# Patient Record
Sex: FEMALE | Race: WHITE | Employment: FULL TIME | URBAN - METROPOLITAN AREA
[De-identification: names, ages, dates, MRNs, and addresses within clinical notes are randomized per-mention and may not be internally consistent; named-entity substitution may affect disease eponyms.]

---

## 2018-02-27 ENCOUNTER — HOSPITAL ENCOUNTER (OUTPATIENT)
Dept: MAMMOGRAPHY | Age: 43
Discharge: HOME | End: 2018-02-27
Attending: NURSE PRACTITIONER
Payer: SELF-PAY

## 2018-02-27 DIAGNOSIS — N63.0 UNSPECIFIED LUMP IN UNSPECIFIED BREAST: ICD-10-CM

## 2018-02-27 PROCEDURE — 76642 ULTRASOUND BREAST LIMITED: CPT

## 2018-02-27 PROCEDURE — 77066 DX MAMMO INCL CAD BI: CPT

## 2019-06-11 ENCOUNTER — APPOINTMENT (RX ONLY)
Dept: URBAN - METROPOLITAN AREA CLINIC 349 | Facility: CLINIC | Age: 44
Setting detail: DERMATOLOGY
End: 2019-06-11

## 2019-06-11 DIAGNOSIS — Z71.89 OTHER SPECIFIED COUNSELING: ICD-10-CM

## 2019-06-11 DIAGNOSIS — D18.0 HEMANGIOMA: ICD-10-CM

## 2019-06-11 DIAGNOSIS — D22 MELANOCYTIC NEVI: ICD-10-CM

## 2019-06-11 PROBLEM — K21.9 GASTRO-ESOPHAGEAL REFLUX DISEASE WITHOUT ESOPHAGITIS: Status: ACTIVE | Noted: 2019-06-11

## 2019-06-11 PROBLEM — J45.909 UNSPECIFIED ASTHMA, UNCOMPLICATED: Status: ACTIVE | Noted: 2019-06-11

## 2019-06-11 PROBLEM — D22.5 MELANOCYTIC NEVI OF TRUNK: Status: ACTIVE | Noted: 2019-06-11

## 2019-06-11 PROBLEM — D22.71 MELANOCYTIC NEVI OF RIGHT LOWER LIMB, INCLUDING HIP: Status: ACTIVE | Noted: 2019-06-11

## 2019-06-11 PROBLEM — M12.9 ARTHROPATHY, UNSPECIFIED: Status: ACTIVE | Noted: 2019-06-11

## 2019-06-11 PROBLEM — D18.01 HEMANGIOMA OF SKIN AND SUBCUTANEOUS TISSUE: Status: ACTIVE | Noted: 2019-06-11

## 2019-06-11 PROCEDURE — A4550 SURGICAL TRAYS: HCPCS

## 2019-06-11 PROCEDURE — 99243 OFF/OP CNSLTJ NEW/EST LOW 30: CPT | Mod: 25

## 2019-06-11 PROCEDURE — 11306 SHAVE SKIN LESION 0.6-1.0 CM: CPT

## 2019-06-11 PROCEDURE — ? COUNSELING

## 2019-06-11 PROCEDURE — ? SHAVE REMOVAL

## 2019-06-11 PROCEDURE — ? EDUCATIONAL RESOURCES PROVIDED

## 2019-06-11 ASSESSMENT — LOCATION SIMPLE DESCRIPTION DERM
LOCATION SIMPLE: RIGHT 3RD TOE
LOCATION SIMPLE: ABDOMEN
LOCATION SIMPLE: RIGHT UPPER BACK

## 2019-06-11 ASSESSMENT — LOCATION ZONE DERM
LOCATION ZONE: TRUNK
LOCATION ZONE: TOE

## 2019-06-11 ASSESSMENT — LOCATION DETAILED DESCRIPTION DERM
LOCATION DETAILED: RIGHT LATERAL 3RD TOE
LOCATION DETAILED: SUBXIPHOID
LOCATION DETAILED: RIGHT MID-UPPER BACK

## 2019-06-11 NOTE — PROCEDURE: SHAVE REMOVAL
Detail Level: Detailed
Anesthesia Volume In Cc: 1.5
Medical Necessity Clause: This procedure was medically necessary because the lesion has a history of change
Size Of Lesion In Cm (Required): 0.6
Accession #: [facility]
Render Post-Care Instructions In Note?: yes
Consent was obtained from the patient. The risks and benefits to therapy were discussed in detail. Specifically, the risks of infection, scarring, bleeding, prolonged wound healing, incomplete removal, allergy to anesthesia, nerve injury and recurrence were addressed. Prior to the procedure, the treatment site was clearly identified and confirmed by the patient. All components of Universal Protocol/PAUSE Rule completed.
Wound Care: Vaseline
Billing Type: Third-Party Bill
Add Variable For Additional Medical Justification: No
Notification Instructions: Patient will be notified of biopsy results. However, patient instructed to call the office if not contacted within 2 weeks.
Medical Necessity Information: It is in your best interest to select a reason for this procedure from the list below. All of these items fulfill various CMS LCD requirements except the new and changing color options.
Biopsy Method: Personna blade
Hemostasis: Aluminum Chloride
X Size Of Lesion In Cm (Optional): 0
Post-Care Instructions: I reviewed with the patient in detail post-care instructions. Patient is to keep the biopsy site dry overnight, and then apply Vaseline daily until healed. Patient may apply hydrogen peroxide soaks to remove any crusting. After the procedure, the patient was oriented to person, place, and time. Patient denied feeling dizzy, queasy, and and declined further observation after initial 5 minute observation time.

## 2023-02-18 ENCOUNTER — APPOINTMENT (OUTPATIENT)
Dept: GENERAL RADIOLOGY | Age: 48
End: 2023-02-18
Payer: COMMERCIAL

## 2023-02-18 ENCOUNTER — HOSPITAL ENCOUNTER (EMERGENCY)
Age: 48
Discharge: HOME | End: 2023-02-18
Attending: EMERGENCY MEDICINE
Payer: COMMERCIAL

## 2023-02-18 VITALS
TEMPERATURE: 98.1 F | BODY MASS INDEX: 49.13 KG/M2 | RESPIRATION RATE: 17 BRPM | OXYGEN SATURATION: 100 % | HEIGHT: 62 IN | HEART RATE: 89 BPM | DIASTOLIC BLOOD PRESSURE: 93 MMHG | SYSTOLIC BLOOD PRESSURE: 140 MMHG | WEIGHT: 267 LBS

## 2023-02-18 DIAGNOSIS — R68.89 FLU-LIKE SYMPTOMS: Primary | ICD-10-CM

## 2023-02-18 LAB
ALBUMIN SERPL-MCNC: 3.1 G/DL (ref 3.5–5)
ALBUMIN/GLOB SERPL: 0.6 (ref 0.4–1.6)
ALP SERPL-CCNC: 79 U/L (ref 50–136)
ALT SERPL-CCNC: 54 U/L (ref 12–65)
ANION GAP SERPL CALC-SCNC: 6 MMOL/L (ref 2–11)
AST SERPL-CCNC: 48 U/L (ref 15–37)
BASOPHILS # BLD: 0.1 K/UL (ref 0–0.2)
BASOPHILS NFR BLD: 1 % (ref 0–2)
BILIRUB SERPL-MCNC: 0.2 MG/DL (ref 0.2–1.1)
BUN SERPL-MCNC: 13 MG/DL (ref 6–23)
CALCIUM SERPL-MCNC: 9.5 MG/DL (ref 8.3–10.4)
CHLORIDE SERPL-SCNC: 106 MMOL/L (ref 101–110)
CO2 SERPL-SCNC: 25 MMOL/L (ref 21–32)
CREAT SERPL-MCNC: 0.8 MG/DL (ref 0.6–1)
DIFFERENTIAL METHOD BLD: ABNORMAL
EKG ATRIAL RATE: 93 BPM
EKG DIAGNOSIS: NORMAL
EKG P AXIS: 6 DEGREES
EKG P-R INTERVAL: 141 MS
EKG Q-T INTERVAL: 368 MS
EKG QRS DURATION: 82 MS
EKG QTC CALCULATION (BAZETT): 456 MS
EKG R AXIS: 20 DEGREES
EKG T AXIS: 51 DEGREES
EKG VENTRICULAR RATE: 92 BPM
EOSINOPHIL # BLD: 0 K/UL (ref 0–0.8)
EOSINOPHIL NFR BLD: 0 % (ref 0.5–7.8)
ERYTHROCYTE [DISTWIDTH] IN BLOOD BY AUTOMATED COUNT: 14.7 % (ref 11.9–14.6)
FLUAV RNA SPEC QL NAA+PROBE: NOT DETECTED
FLUBV RNA SPEC QL NAA+PROBE: NOT DETECTED
GLOBULIN SER CALC-MCNC: 5.1 G/DL (ref 2.8–4.5)
GLUCOSE SERPL-MCNC: 147 MG/DL (ref 65–100)
HCT VFR BLD AUTO: 45.6 % (ref 35.8–46.3)
HGB BLD-MCNC: 14.8 G/DL (ref 11.7–15.4)
IMM GRANULOCYTES # BLD AUTO: 0.1 K/UL (ref 0–0.5)
IMM GRANULOCYTES NFR BLD AUTO: 1 % (ref 0–5)
LACTATE SERPL-SCNC: 1.8 MMOL/L (ref 0.4–2)
LACTATE SERPL-SCNC: 2.1 MMOL/L (ref 0.4–2)
LYMPHOCYTES # BLD: 1 K/UL (ref 0.5–4.6)
LYMPHOCYTES NFR BLD: 11 % (ref 13–44)
MAGNESIUM SERPL-MCNC: 2 MG/DL (ref 1.8–2.4)
MCH RBC QN AUTO: 28.2 PG (ref 26.1–32.9)
MCHC RBC AUTO-ENTMCNC: 32.5 G/DL (ref 31.4–35)
MCV RBC AUTO: 86.9 FL (ref 82–102)
MONOCYTES # BLD: 0.3 K/UL (ref 0.1–1.3)
MONOCYTES NFR BLD: 3 % (ref 4–12)
NEUTS SEG # BLD: 7.8 K/UL (ref 1.7–8.2)
NEUTS SEG NFR BLD: 84 % (ref 43–78)
NRBC # BLD: 0 K/UL (ref 0–0.2)
PLATELET # BLD AUTO: 476 K/UL (ref 150–450)
PMV BLD AUTO: 9 FL (ref 9.4–12.3)
POTASSIUM SERPL-SCNC: 3.9 MMOL/L (ref 3.5–5.1)
PROT SERPL-MCNC: 8.2 G/DL (ref 6.3–8.2)
RBC # BLD AUTO: 5.25 M/UL (ref 4.05–5.2)
SARS-COV-2 RDRP RESP QL NAA+PROBE: NOT DETECTED
SODIUM SERPL-SCNC: 137 MMOL/L (ref 133–143)
SOURCE: NORMAL
WBC # BLD AUTO: 9.3 K/UL (ref 4.3–11.1)

## 2023-02-18 PROCEDURE — 71046 X-RAY EXAM CHEST 2 VIEWS: CPT

## 2023-02-18 PROCEDURE — 83605 ASSAY OF LACTIC ACID: CPT

## 2023-02-18 PROCEDURE — 80053 COMPREHEN METABOLIC PANEL: CPT

## 2023-02-18 PROCEDURE — 96361 HYDRATE IV INFUSION ADD-ON: CPT

## 2023-02-18 PROCEDURE — 99285 EMERGENCY DEPT VISIT HI MDM: CPT

## 2023-02-18 PROCEDURE — 85025 COMPLETE CBC W/AUTO DIFF WBC: CPT

## 2023-02-18 PROCEDURE — 93005 ELECTROCARDIOGRAM TRACING: CPT | Performed by: EMERGENCY MEDICINE

## 2023-02-18 PROCEDURE — 87502 INFLUENZA DNA AMP PROBE: CPT

## 2023-02-18 PROCEDURE — 6360000002 HC RX W HCPCS: Performed by: EMERGENCY MEDICINE

## 2023-02-18 PROCEDURE — 96375 TX/PRO/DX INJ NEW DRUG ADDON: CPT

## 2023-02-18 PROCEDURE — 83735 ASSAY OF MAGNESIUM: CPT

## 2023-02-18 PROCEDURE — 96374 THER/PROPH/DIAG INJ IV PUSH: CPT

## 2023-02-18 PROCEDURE — 87635 SARS-COV-2 COVID-19 AMP PRB: CPT

## 2023-02-18 PROCEDURE — 2580000003 HC RX 258: Performed by: EMERGENCY MEDICINE

## 2023-02-18 RX ORDER — HYDROMORPHONE HYDROCHLORIDE 1 MG/ML
1 INJECTION, SOLUTION INTRAMUSCULAR; INTRAVENOUS; SUBCUTANEOUS
Status: COMPLETED | OUTPATIENT
Start: 2023-02-18 | End: 2023-02-18

## 2023-02-18 RX ORDER — 0.9 % SODIUM CHLORIDE 0.9 %
500 INTRAVENOUS SOLUTION INTRAVENOUS ONCE
Status: COMPLETED | OUTPATIENT
Start: 2023-02-18 | End: 2023-02-18

## 2023-02-18 RX ORDER — KETOROLAC TROMETHAMINE 30 MG/ML
30 INJECTION, SOLUTION INTRAMUSCULAR; INTRAVENOUS
Status: COMPLETED | OUTPATIENT
Start: 2023-02-18 | End: 2023-02-18

## 2023-02-18 RX ORDER — 0.9 % SODIUM CHLORIDE 0.9 %
1000 INTRAVENOUS SOLUTION INTRAVENOUS ONCE
Status: COMPLETED | OUTPATIENT
Start: 2023-02-18 | End: 2023-02-18

## 2023-02-18 RX ORDER — ONDANSETRON 2 MG/ML
4 INJECTION INTRAMUSCULAR; INTRAVENOUS
Status: COMPLETED | OUTPATIENT
Start: 2023-02-18 | End: 2023-02-18

## 2023-02-18 RX ORDER — ONDANSETRON 8 MG/1
8 TABLET, ORALLY DISINTEGRATING ORAL EVERY 8 HOURS PRN
Qty: 12 TABLET | Refills: 1 | Status: SHIPPED | OUTPATIENT
Start: 2023-02-18

## 2023-02-18 RX ADMIN — ONDANSETRON 4 MG: 2 INJECTION INTRAMUSCULAR; INTRAVENOUS at 19:06

## 2023-02-18 RX ADMIN — KETOROLAC TROMETHAMINE 30 MG: 30 INJECTION, SOLUTION INTRAMUSCULAR at 19:07

## 2023-02-18 RX ADMIN — SODIUM CHLORIDE 1000 ML: 9 INJECTION, SOLUTION INTRAVENOUS at 19:03

## 2023-02-18 RX ADMIN — HYDROMORPHONE HYDROCHLORIDE 1 MG: 1 INJECTION, SOLUTION INTRAMUSCULAR; INTRAVENOUS; SUBCUTANEOUS at 19:08

## 2023-02-18 RX ADMIN — SODIUM CHLORIDE 500 ML: 9 INJECTION, SOLUTION INTRAVENOUS at 20:41

## 2023-02-18 ASSESSMENT — ENCOUNTER SYMPTOMS
VOMITING: 0
COUGH: 1
EYE DISCHARGE: 0
BACK PAIN: 1
NAUSEA: 1
RHINORRHEA: 0
EYE REDNESS: 0
COLOR CHANGE: 0
FACIAL SWELLING: 0
ABDOMINAL PAIN: 0
DIARRHEA: 0

## 2023-02-18 ASSESSMENT — PAIN SCALES - GENERAL
PAINLEVEL_OUTOF10: 0
PAINLEVEL_OUTOF10: 3
PAINLEVEL_OUTOF10: 3

## 2023-02-18 ASSESSMENT — LIFESTYLE VARIABLES
HOW MANY STANDARD DRINKS CONTAINING ALCOHOL DO YOU HAVE ON A TYPICAL DAY: PATIENT DOES NOT DRINK
HOW OFTEN DO YOU HAVE A DRINK CONTAINING ALCOHOL: NEVER

## 2023-02-18 NOTE — ED TRIAGE NOTES
Patient arrives to ED pov from home. Patient reports flu like symptoms since Monday. Patient reports she has been tested for flu and covid and they have been negative. Patient reports nausea. Denies vomiting. Patient reports history of asthma.

## 2023-02-19 NOTE — DISCHARGE INSTRUCTIONS
Alternate 4 ibuprofen every 8 hours with 2 extra-strength tylenol every 6 hours  Zofran as needed for nausea  Drink plenty of fluids

## 2023-02-19 NOTE — ED NOTES
I have reviewed discharge instructions with the patient. The patient verbalized understanding. Patient left ED via Discharge Method: ambulatory to Home with (insert name of family/friend, self, transport self). Opportunity for questions and clarification provided. Patient given 1 scripts. To continue your aftercare when you leave the hospital, you may receive an automated call from our care team to check in on how you are doing. This is a free service and part of our promise to provide the best care and service to meet your aftercare needs. \" If you have questions, or wish to unsubscribe from this service please call [phone]. Thank you for Choosing our [facility] Emergency Department.         Richard Cuellar RN  02/18/23 6011

## 2023-02-23 ENCOUNTER — HOSPITAL ENCOUNTER (EMERGENCY)
Age: 48
Discharge: HOME | End: 2023-02-23
Attending: EMERGENCY MEDICINE
Payer: COMMERCIAL

## 2023-02-23 ENCOUNTER — APPOINTMENT (OUTPATIENT)
Dept: CT IMAGING | Age: 48
End: 2023-02-23
Payer: COMMERCIAL

## 2023-02-23 VITALS
DIASTOLIC BLOOD PRESSURE: 90 MMHG | SYSTOLIC BLOOD PRESSURE: 149 MMHG | HEIGHT: 62 IN | OXYGEN SATURATION: 96 % | RESPIRATION RATE: 18 BRPM | HEART RATE: 87 BPM | TEMPERATURE: 98 F | BODY MASS INDEX: 49.13 KG/M2 | WEIGHT: 267 LBS

## 2023-02-23 DIAGNOSIS — R06.02 SHORTNESS OF BREATH: ICD-10-CM

## 2023-02-23 DIAGNOSIS — R42 LIGHTHEADEDNESS: Primary | ICD-10-CM

## 2023-02-23 LAB
ALBUMIN SERPL-MCNC: 3.5 G/DL (ref 3.5–5)
ALBUMIN/GLOB SERPL: 0.8 (ref 0.4–1.6)
ALP SERPL-CCNC: 73 U/L (ref 50–136)
ALT SERPL-CCNC: 38 U/L (ref 12–65)
ANION GAP SERPL CALC-SCNC: 4 MMOL/L (ref 2–11)
AST SERPL-CCNC: 24 U/L (ref 15–37)
BASOPHILS # BLD: 0.1 K/UL (ref 0–0.2)
BASOPHILS NFR BLD: 1 % (ref 0–2)
BILIRUB SERPL-MCNC: 0.3 MG/DL (ref 0.2–1.1)
BUN SERPL-MCNC: 19 MG/DL (ref 6–23)
CALCIUM SERPL-MCNC: 9.4 MG/DL (ref 8.3–10.4)
CHLORIDE SERPL-SCNC: 104 MMOL/L (ref 101–110)
CO2 SERPL-SCNC: 28 MMOL/L (ref 21–32)
CREAT SERPL-MCNC: 0.9 MG/DL (ref 0.6–1)
D DIMER PPP FEU-MCNC: 0.32 UG/ML(FEU)
DIFFERENTIAL METHOD BLD: ABNORMAL
EOSINOPHIL # BLD: 0.2 K/UL (ref 0–0.8)
EOSINOPHIL NFR BLD: 1 % (ref 0.5–7.8)
ERYTHROCYTE [DISTWIDTH] IN BLOOD BY AUTOMATED COUNT: 14.9 % (ref 11.9–14.6)
GLOBULIN SER CALC-MCNC: 4.2 G/DL (ref 2.8–4.5)
GLUCOSE SERPL-MCNC: 107 MG/DL (ref 65–100)
HCT VFR BLD AUTO: 47.2 % (ref 35.8–46.3)
HGB BLD-MCNC: 15.2 G/DL (ref 11.7–15.4)
IMM GRANULOCYTES # BLD AUTO: 0.1 K/UL (ref 0–0.5)
IMM GRANULOCYTES NFR BLD AUTO: 1 % (ref 0–5)
LACTATE SERPL-SCNC: 1.8 MMOL/L (ref 0.4–2)
LYMPHOCYTES # BLD: 3.5 K/UL (ref 0.5–4.6)
LYMPHOCYTES NFR BLD: 27 % (ref 13–44)
MAGNESIUM SERPL-MCNC: 2.4 MG/DL (ref 1.8–2.4)
MCH RBC QN AUTO: 28 PG (ref 26.1–32.9)
MCHC RBC AUTO-ENTMCNC: 32.2 G/DL (ref 31.4–35)
MCV RBC AUTO: 87.1 FL (ref 82–102)
MONOCYTES # BLD: 0.9 K/UL (ref 0.1–1.3)
MONOCYTES NFR BLD: 7 % (ref 4–12)
NEUTS SEG # BLD: 8 K/UL (ref 1.7–8.2)
NEUTS SEG NFR BLD: 63 % (ref 43–78)
NRBC # BLD: 0 K/UL (ref 0–0.2)
NT PRO BNP: 31 PG/ML (ref 5–125)
PLATELET # BLD AUTO: 454 K/UL (ref 150–450)
PMV BLD AUTO: 8.6 FL (ref 9.4–12.3)
POTASSIUM SERPL-SCNC: 3.8 MMOL/L (ref 3.5–5.1)
PROT SERPL-MCNC: 7.7 G/DL (ref 6.3–8.2)
RBC # BLD AUTO: 5.42 M/UL (ref 4.05–5.2)
SODIUM SERPL-SCNC: 136 MMOL/L (ref 133–143)
TROPONIN I SERPL HS-MCNC: 5.9 PG/ML (ref 0–14)
TSH, 3RD GENERATION: 3.12 UIU/ML (ref 0.36–3.74)
WBC # BLD AUTO: 12.7 K/UL (ref 4.3–11.1)

## 2023-02-23 PROCEDURE — 96375 TX/PRO/DX INJ NEW DRUG ADDON: CPT

## 2023-02-23 PROCEDURE — 2580000003 HC RX 258

## 2023-02-23 PROCEDURE — 70450 CT HEAD/BRAIN W/O DYE: CPT

## 2023-02-23 PROCEDURE — 83880 ASSAY OF NATRIURETIC PEPTIDE: CPT

## 2023-02-23 PROCEDURE — 93005 ELECTROCARDIOGRAM TRACING: CPT

## 2023-02-23 PROCEDURE — 85379 FIBRIN DEGRADATION QUANT: CPT

## 2023-02-23 PROCEDURE — 96374 THER/PROPH/DIAG INJ IV PUSH: CPT

## 2023-02-23 PROCEDURE — 99284 EMERGENCY DEPT VISIT MOD MDM: CPT

## 2023-02-23 PROCEDURE — 84443 ASSAY THYROID STIM HORMONE: CPT

## 2023-02-23 PROCEDURE — 80053 COMPREHEN METABOLIC PANEL: CPT

## 2023-02-23 PROCEDURE — 85025 COMPLETE CBC W/AUTO DIFF WBC: CPT

## 2023-02-23 PROCEDURE — 84484 ASSAY OF TROPONIN QUANT: CPT

## 2023-02-23 PROCEDURE — 83735 ASSAY OF MAGNESIUM: CPT

## 2023-02-23 PROCEDURE — 83605 ASSAY OF LACTIC ACID: CPT

## 2023-02-23 PROCEDURE — 6360000002 HC RX W HCPCS

## 2023-02-23 RX ORDER — KETOROLAC TROMETHAMINE 30 MG/ML
15 INJECTION, SOLUTION INTRAMUSCULAR; INTRAVENOUS ONCE
Status: COMPLETED | OUTPATIENT
Start: 2023-02-23 | End: 2023-02-23

## 2023-02-23 RX ORDER — 0.9 % SODIUM CHLORIDE 0.9 %
1000 INTRAVENOUS SOLUTION INTRAVENOUS ONCE
Status: COMPLETED | OUTPATIENT
Start: 2023-02-23 | End: 2023-02-23

## 2023-02-23 RX ORDER — ONDANSETRON 2 MG/ML
4 INJECTION INTRAMUSCULAR; INTRAVENOUS
Status: COMPLETED | OUTPATIENT
Start: 2023-02-23 | End: 2023-02-23

## 2023-02-23 RX ORDER — DEXAMETHASONE SODIUM PHOSPHATE 10 MG/ML
10 INJECTION INTRAMUSCULAR; INTRAVENOUS ONCE
Status: COMPLETED | OUTPATIENT
Start: 2023-02-23 | End: 2023-02-23

## 2023-02-23 RX ADMIN — ONDANSETRON 4 MG: 2 INJECTION INTRAMUSCULAR; INTRAVENOUS at 17:28

## 2023-02-23 RX ADMIN — SODIUM CHLORIDE 1000 ML: 9 INJECTION, SOLUTION INTRAVENOUS at 17:27

## 2023-02-23 RX ADMIN — KETOROLAC TROMETHAMINE 15 MG: 30 INJECTION, SOLUTION INTRAMUSCULAR at 17:28

## 2023-02-23 RX ADMIN — DEXAMETHASONE SODIUM PHOSPHATE 10 MG: 10 INJECTION, SOLUTION INTRAMUSCULAR; INTRAVENOUS at 17:28

## 2023-02-23 ASSESSMENT — ENCOUNTER SYMPTOMS
COLOR CHANGE: 0
ABDOMINAL PAIN: 0
NAUSEA: 0
VOMITING: 0
CHEST TIGHTNESS: 0
DIARRHEA: 0
SHORTNESS OF BREATH: 1

## 2023-02-23 ASSESSMENT — PAIN SCALES - GENERAL: PAINLEVEL_OUTOF10: 8

## 2023-02-23 NOTE — ED TRIAGE NOTES
Pt reports cold symptoms since last week and has been self treating at home, last Friday went to [facility] and d/c. Reports feeling worse and came in here last Saturday and sent home. Pt also went to [facility] this Monday and sent home. Went to PCP on Tuesday.  Pt reports worsening dizziness, weakness, lightheadedness,

## 2023-02-23 NOTE — ED PROVIDER NOTES
Emergency Department Provider Note                   PCP:                Fabian Slaughter MD               Age: 50 y.o. Sex: female       ICD-10-CM    1. Lightheadedness  R42       2. Shortness of breath  R06.02           DISPOSITION Decision To Discharge 02/23/2023 07:08:19 PM       Medical Decision Making  40-year-old female history of asthma presenting for evaluation of persistent fatigue, generalized weakness, shortness of breath now accompanied by episodic lightheadedness and presyncope. States she has been evaluated at multiple facilities over the past month without any acute findings. Has tested negative for COVID and flu multiple times. Has had difficulty seeing her PCP during this timeline. Further review of charts revealed very reassuring lab work over this timeline. Her lactic acid was initially elevated at 2.1 last week which improved with fluids. Again multiple negative COVID and flu tests. Patient vitally stable upon arrival.  Is noted to be mildly tachypneic and uncomfortable appearing. Speaking in few word sentences. Lungs clear to auscultation. Exam otherwise unremarkable. Given patient's persistent symptomatology with new lightheadedness and presyncope, we will obtain repeat labs, EKG, D-dimer. Considered adding a repeat chest x-ray, however, had some recent negative read on 2/18 and lungs clear on exam.  Patient did report improvement with fluids and Decadron in the past therefore we will go ahead and get this today. We will give her a dose today. Did not feel repeating COVID and flu swabs necessary given she has been tested multiple times for this in the past several weeks. Patient's work-up unrevealing. CT head normal.  Case discussed with attending physician Dr. Tara Shields given multiple recent ED visits. No apparent cause of her symptomatology, however, no emergent process identified. Patient currently stable for discharge home.     Amount and/or Complexity of Data Reviewed  Labs: ordered. Radiology: ordered and independent interpretation performed. Details: Agree with radiologist interpretation - no acute findings  ECG/medicine tests: ordered. Risk  Prescription drug management. Complexity of Problem: 1 stable, chronic illness. (3)    I have conducted an independent ordering and review of Labs. I have conducted an independent ordering and review of EKG. I have conducted an independent ordering and review of CT Scan. I have reviewed records from an external source: ED records from outside this hospital.  I have reviewed records from an external source: provider visit notes from PCP. Considerations: Shared decision making was utilized in the care of this patient. Considerations: The following labs and/or imaging studies were considered but not ordered: CXR    I discussed disposition with another provider. ED Course as of 02/23/23 1913   Thu Feb 23, 2023   1639 Suspect patient's initial leukocytosis due to recent steroid dose. [LB]   1702 Negative CT head. [LB]   1746 Received meds at 1730. Will reeval shortly.  [LB]      ED Course User Index  [LB] DONITA Grey        Orders Placed This Encounter   Procedures    CT HEAD WO CONTRAST    CBC with Auto Differential    D-Dimer, Quantitative    Troponin    Magnesium    Comprehensive Metabolic Panel    Brain Natriuretic Peptide    TSH    EKG 12 Lead        Medications   0.9 % sodium chloride bolus (0 mLs IntraVENous Stopped 2/23/23 1900)   ondansetron (ZOFRAN) injection 4 mg (4 mg IntraVENous Given 2/23/23 1728)   dexamethasone (DECADRON) injection 10 mg (10 mg IntraVENous Given 2/23/23 1728)   ketorolac (TORADOL) injection 15 mg (15 mg IntraVENous Given 2/23/23 1728)       New Prescriptions    No medications on file        Valarie Sanchez is a 50 y.o. female who presents to the Emergency Department with chief complaint of    Chief Complaint   Patient presents with    Dizziness      Patient is a 70-year-old female with a prior history of asthma presenting to the emergency department for evaluation of generalized weakness, shortness of breath for the past several weeks. States she has been evaluated at multiple facilities regarding these complaints without any acute findings. Has been tested multiple times for COVID and flu which have all been negative. States she has been trying to get to her PCP but has been unable to get seen. States she is now experiencing some associated dizziness, lightheadedness, and weakness and occasionally feels like she is going to lose consciousness. Denies any full loss of consciousness however. Denies any associated fever or chills, vomiting or diarrhea. Some occasional nausea. States she temporarily feel improved with IV fluids but quickly returns to her baseline symptomatology afterwards. She denies any history of cardiopulmonary disease. She has no other complaints today. The history is provided by the patient. Review of Systems   Constitutional:  Positive for fatigue. Negative for chills and fever. Eyes:  Negative for visual disturbance. Respiratory:  Positive for shortness of breath. Negative for chest tightness. Cardiovascular:  Negative for chest pain and palpitations. Gastrointestinal:  Negative for abdominal pain, diarrhea, nausea and vomiting. Genitourinary:  Negative for dysuria. Musculoskeletal:  Negative for arthralgias and myalgias. Skin:  Negative for color change and wound. Neurological:  Positive for weakness and light-headedness. Negative for dizziness, syncope and headaches. All other systems reviewed and are negative. No past medical history on file. No past surgical history on file. No family history on file.      Social History     Socioeconomic History    Marital status: Single        Allergies: Tetanus immune globulin, Peg [polyethylene glycol], Codeine, and Hydrocodone    Previous Medications ONDANSETRON (ZOFRAN-ODT) 8 MG TBDP DISINTEGRATING TABLET    Place 1 tablet under the tongue every 8 hours as needed for Nausea or Vomiting        Vitals signs and nursing note reviewed. Patient Vitals for the past 4 hrs:   Temp Pulse Resp BP SpO2   02/23/23 1549 97.6 °F (36.4 °C) 96 22 (!) 140/97 96 %          Physical Exam  Vitals and nursing note reviewed. Constitutional:       General: She is not in acute distress. Appearance: Normal appearance. She is obese. She is not ill-appearing or diaphoretic. Comments: Generally uncomfortable appearing. Speaking softly in few word sentences. HENT:      Head: Normocephalic and atraumatic. Right Ear: External ear normal.      Left Ear: External ear normal.      Nose: Nose normal.   Eyes:      General: No scleral icterus. Right eye: No discharge. Left eye: No discharge. Extraocular Movements: Extraocular movements intact. Conjunctiva/sclera: Conjunctivae normal.      Pupils: Pupils are equal, round, and reactive to light. Cardiovascular:      Rate and Rhythm: Normal rate and regular rhythm. Pulses: Normal pulses. Heart sounds: Normal heart sounds. Pulmonary:      Effort: Pulmonary effort is normal.      Breath sounds: Normal breath sounds. Abdominal:      General: Abdomen is flat. Palpations: Abdomen is soft. Tenderness: There is no abdominal tenderness. Musculoskeletal:         General: Normal range of motion. Cervical back: Normal range of motion and neck supple. Right lower leg: No edema. Left lower leg: No edema. Skin:     General: Skin is warm and dry. Neurological:      General: No focal deficit present. Mental Status: She is alert and oriented to person, place, and time.    Psychiatric:         Mood and Affect: Mood normal.         Behavior: Behavior normal.        Procedures      Results for orders placed or performed during the hospital encounter of 02/23/23   CT HEAD WO CONTRAST    Narrative    EXAMINATION: CT HEAD WO CONTRAST 2/23/2023 4:27 PM    ACCESSION NUMBER: [id number]    COMPARISON: None available    INDICATION: Persistent refractory lightheadedness, dizziness    TECHNIQUE: Multiple-row detector helical CT examination of the head without  intravenous contrast.     Radiation dose reduction techniques were used for this study. Our CT scanners  use one or all of the following: Automated exposure control, adjustment of the  mA and/or kV according to patient size, iterative reconstruction. FINDINGS:  BRAIN: No intracranial hemorrhage. No mass effect or herniation. The grey-white  matter differentiation is maintained. White matter is within normal limits for  age. VENTRICLES/EXTRA-AXIAL: No hydrocephalus or extra-axial fluid collection. BONES: No acute fracture. SOFT TISSUES: The paranasal sinuses and mastoid air cells are clear. Impression    No evidence of acute territorial infarct, intracranial hemorrhage, or mass  effect.          CBC with Auto Differential   Result Value Ref Range    WBC 12.7 (H) 4.3 - 11.1 K/uL    RBC 5.42 (H) 4.05 - 5.2 M/uL    Hemoglobin 15.2 11.7 - 15.4 g/dL    Hematocrit 47.2 (H) 35.8 - 46.3 %    MCV 87.1 82 - 102 FL    MCH 28.0 26.1 - 32.9 PG    MCHC 32.2 31.4 - 35.0 g/dL    RDW 14.9 (H) 11.9 - 14.6 %    Platelets 807 (H) 956 - 450 K/uL    MPV 8.6 (L) 9.4 - 12.3 FL    nRBC 0.00 0.0 - 0.2 K/uL    Differential Type AUTOMATED      Seg Neutrophils 63 43 - 78 %    Lymphocytes 27 13 - 44 %    Monocytes 7 4.0 - 12.0 %    Eosinophils % 1 0.5 - 7.8 %    Basophils 1 0.0 - 2.0 %    Immature Granulocytes 1 0.0 - 5.0 %    Segs Absolute 8.0 1.7 - 8.2 K/UL    Absolute Lymph # 3.5 0.5 - 4.6 K/UL    Absolute Mono # 0.9 0.1 - 1.3 K/UL    Absolute Eos # 0.2 0.0 - 0.8 K/UL    Basophils Absolute 0.1 0.0 - 0.2 K/UL    Absolute Immature Granulocyte 0.1 0.0 - 0.5 K/UL   Lactate, Sepsis   Result Value Ref Range    Lactic Acid, Sepsis 1.8 0.4 - 2.0 MMOL/L D-Dimer, Quantitative   Result Value Ref Range    D-Dimer, Quant 0.32 <0.56 ug/ml(FEU)   Troponin   Result Value Ref Range    Troponin, High Sensitivity 5.9 0 - 14 pg/mL   Magnesium   Result Value Ref Range    Magnesium 2.4 1.8 - 2.4 mg/dL   Comprehensive Metabolic Panel   Result Value Ref Range    Sodium 136 133 - 143 mmol/L    Potassium 3.8 3.5 - 5.1 mmol/L    Chloride 104 101 - 110 mmol/L    CO2 28 21 - 32 mmol/L    Anion Gap 4 2 - 11 mmol/L    Glucose 107 (H) 65 - 100 mg/dL    BUN 19 6 - 23 MG/DL    Creatinine 0.90 0.6 - 1.0 MG/DL    Est, Glom Filt Rate >60 >60 ml/min/1.73m2    Calcium 9.4 8.3 - 10.4 MG/DL    Total Bilirubin 0.3 0.2 - 1.1 MG/DL    ALT 38 12 - 65 U/L    AST 24 15 - 37 U/L    Alk Phosphatase 73 50 - 136 U/L    Total Protein 7.7 6.3 - 8.2 g/dL    Albumin 3.5 3.5 - 5.0 g/dL    Globulin 4.2 2.8 - 4.5 g/dL    Albumin/Globulin Ratio 0.8 0.4 - 1.6     Brain Natriuretic Peptide   Result Value Ref Range    NT Pro-BNP 31 5 - 125 PG/ML   TSH   Result Value Ref Range    TSH, 3RD GENERATION 3.120 0.358 - 3.740 uIU/mL   EKG 12 Lead   Result Value Ref Range    Ventricular Rate 74 BPM    Atrial Rate 74 BPM    P-R Interval 135 ms    QRS Duration 101 ms    Q-T Interval 412 ms    QTc Calculation (Bazett) 458 ms    P Axis 20 degrees    R Axis 41 degrees    T Axis 45 degrees    Diagnosis Sinus rhythm         CT HEAD WO CONTRAST   Final Result   No evidence of acute territorial infarct, intracranial hemorrhage, or mass   effect. Voice dictation software was used during the making of this note. This software is not perfect and grammatical and other typographical errors may be present. This note has not been completely proofread for errors.       Pam Abdul [place]  02/23/23 3180

## 2023-02-24 LAB
EKG ATRIAL RATE: 74 BPM
EKG DIAGNOSIS: NORMAL
EKG P AXIS: 20 DEGREES
EKG P-R INTERVAL: 135 MS
EKG Q-T INTERVAL: 412 MS
EKG QRS DURATION: 101 MS
EKG QTC CALCULATION (BAZETT): 458 MS
EKG R AXIS: 41 DEGREES
EKG T AXIS: 45 DEGREES
EKG VENTRICULAR RATE: 74 BPM

## 2023-02-24 NOTE — DISCHARGE INSTRUCTIONS
You were evaluated today in the emergency department with lightheadedness and shortness of breath. Your vital signs today were normal.  Lab work was very reassuring. EKG looked good. Did give you some fluids, steroids, and pain medication in the emergency department. Checked for blood clots, intracranial abnormalities with a head CT, thyroid levels which were all normal as well. Uncertain cause to your symptoms, but no emergent cause identified that warrants hospital admission at this time. Plan to follow-up with your primary Dr. Giuseppe James as discussed on the 28th. Please return with any worsening symptoms or concerns in the interim.

## 2023-02-24 NOTE — ED NOTES
I have reviewed discharge instructions with the patient. The patient verbalized understanding. Patient left ED via Discharge Method: ambulatory to Home with self. Opportunity for questions and clarification provided. Patient given 0 scripts. To continue your aftercare when you leave the hospital, you may receive an automated call from our care team to check in on how you are doing. This is a free service and part of our promise to provide the best care and service to meet your aftercare needs.  If you have questions, or wish to unsubscribe from this service please call [phone]. Thank you for Choosing our [facility] Emergency Department.         Braden Stanton RN  02/23/23 4091

## 2024-01-22 NOTE — ED PROVIDER NOTES
-- DO NOT REPLY / DO NOT REPLY ALL --  -- Message is from [facility] [employer] ([facility]) --    General Patient Message: Patient is requesting an order for lab work and also would like a FU appt with Dr. Jose Burton. He hasn't been able to see him in quite some time, is it possible to fit him in the schedule anytime soon? Please call patient. Thank you      Caller Information         Type Contact Phone/Fax    01/22/2024 04:54 PM CST Phone (Incoming) Leonard Arana (Self) [phone] ()          Alternative phone number: n/a    Can a detailed message be left? Yes    Message Turnaround:     Is it Working Hours? Yes - Working Hours                      Emergency Department Provider Note                   PCP:                Alayna Cornejo MD               Age: 50 y.o. Sex: female     No diagnosis found. DISPOSITION          Medical Decision Making  64-year-old female with asthma and flulike syndrome, check chest x-ray and labs provide some fluids and Toradol/Decadron/Zofran  Initial lactic acid little elevated, repeat after fluids improved  Was initially given a refrain from any swabbing but with specter of possible admission considering the lactic acid we did a reswab for flu and COVID both are still negative    Amount and/or Complexity of Data Reviewed  External Data Reviewed: labs and notes. Details: Influenza negative at the office yesterday  Labs: ordered. Details: Flu and COVID remain negative  Initial lactic acid elevated 2.1 but dropped to 1.8 after fluids   CBC and CMP unrevealing  Radiology: ordered and independent interpretation performed. Details: No infiltrates or effusions on chest x-ray  ECG/medicine tests: ordered. Details: EKG normal normal sinus rhythm 92    Risk  OTC drugs. Prescription drug management. Complexity of Problem: 1 acute illness with systemic symptoms. (4)    I have conducted an independent ordering and review of Labs. I have conducted an independent ordering and review of EKG. I have conducted an independent ordering and review of X-rays. I have reviewed records from an external source: previous lab results from outside ED. Considerations: The following labs and/or imaging studies were considered but not ordered: Flu and COVID swabs that she has had several mother were negative        Patient was discharged risks and benefits of hospitalization were discussed.          Orders Placed This Encounter   Procedures    XR CHEST (2 VW)    CBC with Auto Differential    Comprehensive Metabolic Panel    Lactic Acid    Magnesium    EKG 12 Lead        Medications   0.9 % sodium chloride bolus (1,000 mLs IntraVENous New Bag 2/18/23 1903)   HYDROmorphone HCl PF (DILAUDID) injection 1 mg (1 mg IntraVENous Given 2/18/23 1908)   ketorolac (TORADOL) injection 30 mg (30 mg IntraVENous Given 2/18/23 1907)   ondansetron (ZOFRAN) injection 4 mg (4 mg IntraVENous Given 2/18/23 1906)       New Prescriptions    No medications on file        Alicia Purvis is a 50 y.o. female who presents to the Emergency Department with chief complaint of  No chief complaint on file. Chief complaint : Flulike syndrome    HISTORY OF PRESENT ILLNESS :  Location : Diffuse/generalized    Quality : Achy and fatigued with cough    Quantity : Constant    Timing : 6 days now starting February 13    Severity : Moderate to severe    Context : Several home COVID test which were negative, went to an urgent care where they did a influenza test which was negative  Called her PCP who recommended she come to the ER for chest x-ray    Alleviating / exacerbating factors : Minimal relief with Tylenol and aspirin    Associated Symptoms : Nausea but no vomiting, diffuse body aches, no UTI symptoms          Review of Systems   Constitutional:  Positive for activity change, appetite change, fatigue and fever. Negative for chills. HENT:  Positive for congestion. Negative for facial swelling and rhinorrhea. Eyes:  Negative for discharge and redness. Respiratory:  Positive for cough. Cardiovascular:  Negative for chest pain and palpitations. Gastrointestinal:  Positive for nausea. Negative for abdominal pain, diarrhea and vomiting. Endocrine: Negative for polydipsia and polyuria. Genitourinary:  Negative for difficulty urinating, dysuria, flank pain and hematuria. Musculoskeletal:  Positive for arthralgias, back pain and myalgias. Skin:  Negative for color change and pallor. Neurological:  Positive for dizziness and light-headedness. Negative for headaches. All other systems reviewed and are negative. No past medical history on file. No past surgical history on file. No family history on file. Social History     Socioeconomic History    Marital status: Single         Tetanus immune globulin, Peg [polyethylene glycol], Codeine, and Hydrocodone     Previous Medications    No medications on file        Vitals signs and nursing note reviewed. Patient Vitals for the past 4 hrs:   Temp Pulse Resp BP SpO2   02/18/23 1819 98.1 °F (36.7 °C) 95 16 (!) 159/92 96 %          Physical Exam  Vitals and nursing note reviewed. Constitutional:       General: She is not in acute distress. Appearance: Normal appearance. She is ill-appearing. She is not toxic-appearing or diaphoretic. HENT:      Head: Normocephalic and atraumatic. Right Ear: External ear normal.      Left Ear: External ear normal.      Nose: No rhinorrhea. Mouth/Throat:      Mouth: Mucous membranes are moist.      Pharynx: Oropharynx is clear. No oropharyngeal exudate or posterior oropharyngeal erythema. Eyes:      General: No scleral icterus. Right eye: No discharge. Left eye: No discharge. Extraocular Movements: Extraocular movements intact. Conjunctiva/sclera: Conjunctivae normal.   Cardiovascular:      Rate and Rhythm: Normal rate and regular rhythm. Pulmonary:      Effort: Pulmonary effort is normal. No respiratory distress. Breath sounds: Normal breath sounds. No wheezing or rales. Abdominal:      General: Abdomen is flat. Palpations: Abdomen is soft. There is no fluid wave or pulsatile mass. Tenderness: There is no abdominal tenderness. There is no guarding or rebound. Musculoskeletal:         General: No deformity or signs of injury. Normal range of motion. Cervical back: Normal range of motion and neck supple. No rigidity. Skin:     General: Skin is warm and dry. Coloration: Skin is not jaundiced or pale. Neurological:      General: No focal deficit present.       Mental Status: She is alert and oriented to person, place, and time. Mental status is at baseline. Gait: Gait normal.   Psychiatric:         Mood and Affect: Mood normal.         Behavior: Behavior normal.         Thought Content: Thought content normal.        EKG 12 Lead    Date/Time: 2/18/2023 7:18 PM  Performed by: Lafrances Skiff, MD  Authorized by: Lafrances Skiff, MD     ECG reviewed by ED Physician in the absence of a cardiologist: yes    Previous ECG:     Previous ECG:  Unavailable  Interpretation:     Interpretation: normal    Rate:     ECG rate:  92    ECG rate assessment: normal    Rhythm:     Rhythm: sinus rhythm    Ectopy:     Ectopy: none    QRS:     QRS axis:  Normal    QRS intervals:  Normal    QRS conduction: normal    ST segments:     ST segments:  Normal  T waves:     T waves: normal      Results for orders placed or performed during the hospital encounter of 02/18/23   EKG 12 Lead   Result Value Ref Range    Ventricular Rate 92 BPM    Atrial Rate 93 BPM    P-R Interval 141 ms    QRS Duration 82 ms    Q-T Interval 368 ms    QTc Calculation (Bazett) 456 ms    P Axis 6 degrees    R Axis 20 degrees    T Axis 51 degrees    Diagnosis Sinus rhythm         XR CHEST (2 VW)    (Results Pending)                       Voice dictation software was used during the making of this note. This software is not perfect and grammatical and other typographical errors may be present. This note has not been completely proofread for errors.        Lafrances Skiff, MD  02/18/23 1918       Lafrances Skiff, MD  02/18/23 1939       Lafrances Skiff, MD  02/18/23 7967